# Patient Record
Sex: MALE | Race: WHITE | URBAN - METROPOLITAN AREA
[De-identification: names, ages, dates, MRNs, and addresses within clinical notes are randomized per-mention and may not be internally consistent; named-entity substitution may affect disease eponyms.]

---

## 2024-04-01 ENCOUNTER — TELEHEALTH PROVIDED OTHER THAN IN PATIENT'S HOME (OUTPATIENT)
Dept: URBAN - METROPOLITAN AREA TELEHEALTH 3 | Facility: TELEHEALTH | Age: 19
End: 2024-04-01
Payer: MEDICAID

## 2024-04-01 VITALS — HEIGHT: 70 IN | WEIGHT: 155 LBS

## 2024-04-01 DIAGNOSIS — R19.5 OTHER FECAL ABNORMALITIES: ICD-10-CM

## 2024-04-01 DIAGNOSIS — K21.9 GASTRO-ESOPHAGEAL REFLUX DISEASE WITHOUT ESOPHAGITIS: ICD-10-CM

## 2024-04-01 PROCEDURE — 99204 OFFICE O/P NEW MOD 45 MIN: CPT | Mod: 95 | Performed by: NURSE PRACTITIONER

## 2024-04-01 NOTE — SERVICEHPINOTES
PATIENT VERIFIED BY DATE OF BIRTH AND NAME. Patient has been consented for this telecommunication visit using [employer] application. Whole life, had severe GERD and constipation. Throat burns occasionally. Takes antacid prn. br After taking a course of antibiotics several months ago, has some loose bowels.
br Denies nausea, vomiting, dysphagia, epigastric pain or weight loss. 
brBowel fluctuates with diarrhea. Small BMs. Had solids stools about 3 days ago. Denies blood in stool, melena. Some cramps at lower abdomen which gets better after BMs. 
br
br Denies family hx of colon cancer. 
br Denies personal hx of CVD.  br patricia
br

## 2024-04-01 NOTE — SERVICENOTES
Patient was located in their home during visit., Patient's visit was conducted through [employer] video telecommunication. Patient consented before the start of visit as to understanding of privacy concerns, possible technological failure, and their responsibility of carrying out instructions of plan., I spent 25 minutes today with the patient for this telehealth visit.